# Patient Record
Sex: FEMALE | Race: BLACK OR AFRICAN AMERICAN | NOT HISPANIC OR LATINO | Employment: FULL TIME | URBAN - METROPOLITAN AREA
[De-identification: names, ages, dates, MRNs, and addresses within clinical notes are randomized per-mention and may not be internally consistent; named-entity substitution may affect disease eponyms.]

---

## 2023-11-23 ENCOUNTER — HOSPITAL ENCOUNTER (EMERGENCY)
Facility: HOSPITAL | Age: 23
Discharge: LEFT BEFORE DISCHARGE | End: 2023-11-24
Payer: COMMERCIAL

## 2023-11-23 ENCOUNTER — APPOINTMENT (OUTPATIENT)
Dept: RADIOLOGY | Facility: HOSPITAL | Age: 23
End: 2023-11-23
Payer: COMMERCIAL

## 2023-11-23 VITALS
TEMPERATURE: 97.3 F | SYSTOLIC BLOOD PRESSURE: 136 MMHG | WEIGHT: 153 LBS | BODY MASS INDEX: 27.11 KG/M2 | OXYGEN SATURATION: 98 % | RESPIRATION RATE: 18 BRPM | HEART RATE: 82 BPM | DIASTOLIC BLOOD PRESSURE: 110 MMHG | HEIGHT: 63 IN

## 2023-11-23 PROCEDURE — 71046 X-RAY EXAM CHEST 2 VIEWS: CPT | Performed by: RADIOLOGY

## 2023-11-23 PROCEDURE — 71046 X-RAY EXAM CHEST 2 VIEWS: CPT | Mod: FY

## 2023-11-23 PROCEDURE — 99284 EMERGENCY DEPT VISIT MOD MDM: CPT | Mod: 25

## 2023-11-23 PROCEDURE — 99281 EMR DPT VST MAYX REQ PHY/QHP: CPT

## 2023-11-23 ASSESSMENT — PAIN - FUNCTIONAL ASSESSMENT: PAIN_FUNCTIONAL_ASSESSMENT: 0-10

## 2023-11-23 ASSESSMENT — COLUMBIA-SUICIDE SEVERITY RATING SCALE - C-SSRS
6. HAVE YOU EVER DONE ANYTHING, STARTED TO DO ANYTHING, OR PREPARED TO DO ANYTHING TO END YOUR LIFE?: NO
2. HAVE YOU ACTUALLY HAD ANY THOUGHTS OF KILLING YOURSELF?: NO
1. IN THE PAST MONTH, HAVE YOU WISHED YOU WERE DEAD OR WISHED YOU COULD GO TO SLEEP AND NOT WAKE UP?: NO

## 2023-11-23 ASSESSMENT — PAIN DESCRIPTION - PAIN TYPE: TYPE: ACUTE PAIN

## 2023-11-23 ASSESSMENT — PAIN DESCRIPTION - FREQUENCY: FREQUENCY: CONSTANT/CONTINUOUS

## 2023-11-23 ASSESSMENT — PAIN DESCRIPTION - LOCATION: LOCATION: CHEST

## 2023-11-23 ASSESSMENT — PAIN SCALES - GENERAL: PAINLEVEL_OUTOF10: 10 - WORST POSSIBLE PAIN

## 2023-11-23 ASSESSMENT — PAIN DESCRIPTION - DESCRIPTORS: DESCRIPTORS: SHARP

## 2023-11-24 LAB
ALBUMIN SERPL BCP-MCNC: 4.5 G/DL (ref 3.4–5)
ALP SERPL-CCNC: 64 U/L (ref 33–110)
ALT SERPL W P-5'-P-CCNC: 9 U/L (ref 7–45)
ANION GAP SERPL CALC-SCNC: 12 MMOL/L (ref 10–20)
AST SERPL W P-5'-P-CCNC: 13 U/L (ref 9–39)
B-HCG SERPL-ACNC: <2 MIU/ML
BASOPHILS # BLD AUTO: 0.03 X10*3/UL (ref 0–0.1)
BASOPHILS NFR BLD AUTO: 0.3 %
BILIRUB SERPL-MCNC: 0.4 MG/DL (ref 0–1.2)
BUN SERPL-MCNC: 17 MG/DL (ref 6–23)
CALCIUM SERPL-MCNC: 9.6 MG/DL (ref 8.6–10.3)
CARDIAC TROPONIN I PNL SERPL HS: <3 NG/L (ref 0–13)
CHLORIDE SERPL-SCNC: 104 MMOL/L (ref 98–107)
CO2 SERPL-SCNC: 27 MMOL/L (ref 21–32)
CREAT SERPL-MCNC: 0.73 MG/DL (ref 0.5–1.05)
EOSINOPHIL # BLD AUTO: 0.01 X10*3/UL (ref 0–0.7)
EOSINOPHIL NFR BLD AUTO: 0.1 %
ERYTHROCYTE [DISTWIDTH] IN BLOOD BY AUTOMATED COUNT: 11.2 % (ref 11.5–14.5)
GFR SERPL CREATININE-BSD FRML MDRD: >90 ML/MIN/1.73M*2
GLUCOSE SERPL-MCNC: 109 MG/DL (ref 74–99)
HCT VFR BLD AUTO: 39.5 % (ref 36–46)
HGB BLD-MCNC: 13.2 G/DL (ref 12–16)
IMM GRANULOCYTES # BLD AUTO: 0.03 X10*3/UL (ref 0–0.7)
IMM GRANULOCYTES NFR BLD AUTO: 0.3 % (ref 0–0.9)
LYMPHOCYTES # BLD AUTO: 2.14 X10*3/UL (ref 1.2–4.8)
LYMPHOCYTES NFR BLD AUTO: 18.3 %
MAGNESIUM SERPL-MCNC: 1.9 MG/DL (ref 1.6–2.4)
MCH RBC QN AUTO: 29.2 PG (ref 26–34)
MCHC RBC AUTO-ENTMCNC: 33.4 G/DL (ref 32–36)
MCV RBC AUTO: 87 FL (ref 80–100)
MONOCYTES # BLD AUTO: 0.41 X10*3/UL (ref 0.1–1)
MONOCYTES NFR BLD AUTO: 3.5 %
NEUTROPHILS # BLD AUTO: 9.09 X10*3/UL (ref 1.2–7.7)
NEUTROPHILS NFR BLD AUTO: 77.5 %
NRBC BLD-RTO: 0 /100 WBCS (ref 0–0)
PLATELET # BLD AUTO: 308 X10*3/UL (ref 150–450)
POTASSIUM SERPL-SCNC: 4.1 MMOL/L (ref 3.5–5.3)
PROT SERPL-MCNC: 7.5 G/DL (ref 6.4–8.2)
RBC # BLD AUTO: 4.52 X10*6/UL (ref 4–5.2)
SODIUM SERPL-SCNC: 139 MMOL/L (ref 136–145)
WBC # BLD AUTO: 11.7 X10*3/UL (ref 4.4–11.3)

## 2023-11-24 PROCEDURE — 80053 COMPREHEN METABOLIC PANEL: CPT

## 2023-11-24 PROCEDURE — 85025 COMPLETE CBC W/AUTO DIFF WBC: CPT

## 2023-11-24 PROCEDURE — 84702 CHORIONIC GONADOTROPIN TEST: CPT

## 2023-11-24 PROCEDURE — 84484 ASSAY OF TROPONIN QUANT: CPT

## 2023-11-24 PROCEDURE — 83735 ASSAY OF MAGNESIUM: CPT

## 2023-11-24 PROCEDURE — 36415 COLL VENOUS BLD VENIPUNCTURE: CPT

## 2024-08-16 ENCOUNTER — HOSPITAL ENCOUNTER (EMERGENCY)
Facility: HOSPITAL | Age: 24
Discharge: HOME | End: 2024-08-16
Attending: EMERGENCY MEDICINE
Payer: COMMERCIAL

## 2024-08-16 ENCOUNTER — APPOINTMENT (OUTPATIENT)
Dept: RADIOLOGY | Facility: HOSPITAL | Age: 24
End: 2024-08-16
Payer: COMMERCIAL

## 2024-08-16 ENCOUNTER — APPOINTMENT (OUTPATIENT)
Dept: CARDIOLOGY | Facility: HOSPITAL | Age: 24
End: 2024-08-16
Payer: COMMERCIAL

## 2024-08-16 VITALS
HEIGHT: 63 IN | HEART RATE: 73 BPM | WEIGHT: 158 LBS | BODY MASS INDEX: 28 KG/M2 | TEMPERATURE: 98.3 F | SYSTOLIC BLOOD PRESSURE: 107 MMHG | OXYGEN SATURATION: 100 % | DIASTOLIC BLOOD PRESSURE: 76 MMHG | RESPIRATION RATE: 16 BRPM

## 2024-08-16 DIAGNOSIS — D69.6 THROMBOCYTOPENIA (CMS-HCC): ICD-10-CM

## 2024-08-16 DIAGNOSIS — R11.2 NAUSEA AND VOMITING, UNSPECIFIED VOMITING TYPE: Primary | ICD-10-CM

## 2024-08-16 LAB
ALBUMIN SERPL BCP-MCNC: 4.8 G/DL (ref 3.4–5)
ALP SERPL-CCNC: 73 U/L (ref 33–110)
ALT SERPL W P-5'-P-CCNC: 18 U/L (ref 7–45)
ANION GAP SERPL CALC-SCNC: 11 MMOL/L (ref 10–20)
APPEARANCE UR: ABNORMAL
APTT PPP: 31 SECONDS (ref 27–38)
AST SERPL W P-5'-P-CCNC: 22 U/L (ref 9–39)
B-HCG SERPL-ACNC: <2 MIU/ML
BACTERIA #/AREA URNS AUTO: ABNORMAL /HPF
BASOPHILS # BLD AUTO: 0.04 X10*3/UL (ref 0–0.1)
BASOPHILS NFR BLD AUTO: 0.7 %
BILIRUB SERPL-MCNC: 0.4 MG/DL (ref 0–1.2)
BILIRUB UR STRIP.AUTO-MCNC: NEGATIVE MG/DL
BUN SERPL-MCNC: 11 MG/DL (ref 6–23)
CALCIUM SERPL-MCNC: 9.5 MG/DL (ref 8.6–10.3)
CARDIAC TROPONIN I PNL SERPL HS: <3 NG/L (ref 0–13)
CARDIAC TROPONIN I PNL SERPL HS: <3 NG/L (ref 0–13)
CHLORIDE SERPL-SCNC: 102 MMOL/L (ref 98–107)
CO2 SERPL-SCNC: 27 MMOL/L (ref 21–32)
COLOR UR: ABNORMAL
CREAT SERPL-MCNC: 0.72 MG/DL (ref 0.5–1.05)
EGFRCR SERPLBLD CKD-EPI 2021: >90 ML/MIN/1.73M*2
EOSINOPHIL # BLD AUTO: 0.12 X10*3/UL (ref 0–0.7)
EOSINOPHIL NFR BLD AUTO: 2 %
ERYTHROCYTE [DISTWIDTH] IN BLOOD BY AUTOMATED COUNT: 14.2 % (ref 11.5–14.5)
GLUCOSE SERPL-MCNC: 80 MG/DL (ref 74–99)
GLUCOSE UR STRIP.AUTO-MCNC: NORMAL MG/DL
HCT VFR BLD AUTO: 41 % (ref 36–46)
HGB BLD-MCNC: 13.2 G/DL (ref 12–16)
IMM GRANULOCYTES # BLD AUTO: 0.04 X10*3/UL (ref 0–0.7)
IMM GRANULOCYTES NFR BLD AUTO: 0.7 % (ref 0–0.9)
INR PPP: 1.1 (ref 0.9–1.1)
KETONES UR STRIP.AUTO-MCNC: NEGATIVE MG/DL
LEUKOCYTE ESTERASE UR QL STRIP.AUTO: ABNORMAL
LIPASE SERPL-CCNC: 74 U/L (ref 9–82)
LYMPHOCYTES # BLD AUTO: 2.18 X10*3/UL (ref 1.2–4.8)
LYMPHOCYTES NFR BLD AUTO: 36.1 %
MCH RBC QN AUTO: 27.8 PG (ref 26–34)
MCHC RBC AUTO-ENTMCNC: 32.2 G/DL (ref 32–36)
MCV RBC AUTO: 86 FL (ref 80–100)
MONOCYTES # BLD AUTO: 0.48 X10*3/UL (ref 0.1–1)
MONOCYTES NFR BLD AUTO: 7.9 %
MUCOUS THREADS #/AREA URNS AUTO: ABNORMAL /LPF
NEUTROPHILS # BLD AUTO: 3.18 X10*3/UL (ref 1.2–7.7)
NEUTROPHILS NFR BLD AUTO: 52.6 %
NITRITE UR QL STRIP.AUTO: NEGATIVE
NRBC BLD-RTO: 0 /100 WBCS (ref 0–0)
PH UR STRIP.AUTO: 8 [PH]
PLATELET # BLD AUTO: 71 X10*3/UL (ref 150–450)
POTASSIUM SERPL-SCNC: 4.2 MMOL/L (ref 3.5–5.3)
PROT SERPL-MCNC: 9.1 G/DL (ref 6.4–8.2)
PROT UR STRIP.AUTO-MCNC: ABNORMAL MG/DL
PROTHROMBIN TIME: 12.6 SECONDS (ref 9.8–12.8)
RBC # BLD AUTO: 4.75 X10*6/UL (ref 4–5.2)
RBC # UR STRIP.AUTO: NEGATIVE /UL
RBC #/AREA URNS AUTO: ABNORMAL /HPF
RBC MORPH BLD: NORMAL
SARS-COV-2 RNA RESP QL NAA+PROBE: NOT DETECTED
SODIUM SERPL-SCNC: 136 MMOL/L (ref 136–145)
SP GR UR STRIP.AUTO: 1.02
SQUAMOUS #/AREA URNS AUTO: ABNORMAL /HPF
UROBILINOGEN UR STRIP.AUTO-MCNC: NORMAL MG/DL
WBC # BLD AUTO: 6 X10*3/UL (ref 4.4–11.3)
WBC #/AREA URNS AUTO: ABNORMAL /HPF

## 2024-08-16 PROCEDURE — 70450 CT HEAD/BRAIN W/O DYE: CPT

## 2024-08-16 PROCEDURE — 71046 X-RAY EXAM CHEST 2 VIEWS: CPT

## 2024-08-16 PROCEDURE — 81001 URINALYSIS AUTO W/SCOPE: CPT | Performed by: EMERGENCY MEDICINE

## 2024-08-16 PROCEDURE — 83690 ASSAY OF LIPASE: CPT | Performed by: SURGERY

## 2024-08-16 PROCEDURE — 80053 COMPREHEN METABOLIC PANEL: CPT | Performed by: EMERGENCY MEDICINE

## 2024-08-16 PROCEDURE — 84702 CHORIONIC GONADOTROPIN TEST: CPT | Performed by: SURGERY

## 2024-08-16 PROCEDURE — 71046 X-RAY EXAM CHEST 2 VIEWS: CPT | Performed by: RADIOLOGY

## 2024-08-16 PROCEDURE — 99285 EMERGENCY DEPT VISIT HI MDM: CPT | Mod: 25

## 2024-08-16 PROCEDURE — 36415 COLL VENOUS BLD VENIPUNCTURE: CPT | Performed by: EMERGENCY MEDICINE

## 2024-08-16 PROCEDURE — 84484 ASSAY OF TROPONIN QUANT: CPT | Performed by: EMERGENCY MEDICINE

## 2024-08-16 PROCEDURE — 85610 PROTHROMBIN TIME: CPT | Performed by: EMERGENCY MEDICINE

## 2024-08-16 PROCEDURE — 93005 ELECTROCARDIOGRAM TRACING: CPT | Mod: 59

## 2024-08-16 PROCEDURE — 70450 CT HEAD/BRAIN W/O DYE: CPT | Performed by: RADIOLOGY

## 2024-08-16 PROCEDURE — 87635 SARS-COV-2 COVID-19 AMP PRB: CPT | Performed by: EMERGENCY MEDICINE

## 2024-08-16 PROCEDURE — 2500000004 HC RX 250 GENERAL PHARMACY W/ HCPCS (ALT 636 FOR OP/ED): Performed by: EMERGENCY MEDICINE

## 2024-08-16 PROCEDURE — 93005 ELECTROCARDIOGRAM TRACING: CPT

## 2024-08-16 PROCEDURE — 85025 COMPLETE CBC W/AUTO DIFF WBC: CPT | Performed by: EMERGENCY MEDICINE

## 2024-08-16 PROCEDURE — 96374 THER/PROPH/DIAG INJ IV PUSH: CPT

## 2024-08-16 RX ORDER — ONDANSETRON HYDROCHLORIDE 2 MG/ML
4 INJECTION, SOLUTION INTRAVENOUS ONCE
Status: COMPLETED | OUTPATIENT
Start: 2024-08-16 | End: 2024-08-16

## 2024-08-16 RX ORDER — ACETAMINOPHEN 325 MG/1
975 TABLET ORAL ONCE
Status: COMPLETED | OUTPATIENT
Start: 2024-08-16 | End: 2024-08-16

## 2024-08-16 RX ORDER — ONDANSETRON 4 MG/1
4 TABLET, FILM COATED ORAL EVERY 6 HOURS
Qty: 8 TABLET | Refills: 0 | Status: SHIPPED | OUTPATIENT
Start: 2024-08-16 | End: 2024-08-18

## 2024-08-16 ASSESSMENT — PAIN - FUNCTIONAL ASSESSMENT
PAIN_FUNCTIONAL_ASSESSMENT: 0-10
PAIN_FUNCTIONAL_ASSESSMENT: 0-10

## 2024-08-16 ASSESSMENT — PAIN SCALES - GENERAL
PAINLEVEL_OUTOF10: 7
PAINLEVEL_OUTOF10: 6

## 2024-08-16 ASSESSMENT — PAIN DESCRIPTION - LOCATION: LOCATION: HEAD

## 2024-08-16 ASSESSMENT — PAIN DESCRIPTION - DESCRIPTORS: DESCRIPTORS: SHARP

## 2024-08-16 NOTE — DISCHARGE INSTRUCTIONS
Please make sure to schedule an appointment with hematology and to establish care with primary care physician.  Return immediately if concerning symptoms, as discussed.

## 2024-08-16 NOTE — PROGRESS NOTES
Pharmacy Medication History Review    Erika Guardado is a 24 y.o. female admitted for No Principal Problem: There is no principal problem currently on the Problem List. Please update the Problem List and refresh.. Pharmacy reviewed the patient's [id number] medications and allergies for accuracy.    The list below reflectives the updated PTA list. Please review each medication in order reconciliation for additional clarification and justification.  Prior to Admission medications    Not on File        The list below reflectives the updated allergy list. Please review each documented allergy for additional clarification and justification.  Allergies  Reviewed by Keegan Tran CPhT on 8/16/2024        Severity Reactions Comments    Amoxicillin Medium Rash, Unknown rash   rash rash            Below are additional concerns with the patient's PTA list.  The following updates were made to the Prior to Admission medication list:     Source of Information:     Medications ADDED:   N/a  Medications CHANGED:  N/a  Medications REMOVED:   N/a  Medications NOT TAKING:   N/a    Allergy reviewed : Yes    Comments: spoke to patient      Keegan Tran CPhT

## 2024-08-16 NOTE — ED TRIAGE NOTES
Pt presents with the c/o vomiting after eating [employer] this morning. Pt states that she started experiencing chest pain after having a near syncopal episode about an hour ago. Pt currently rates her pain 7/10 and describes as sharp.

## 2024-08-16 NOTE — ED PROVIDER NOTES
HPI   Chief Complaint   Patient presents with    Chest Pain       HPI  Patient is a 24-year-old female with a past medical history significant for blood transfusion after delivery in 2021, cholecystectomy in December 2023 during which time she received Lovenox for DVT prophylaxis who presents emergency room with a chief complaint of chest pain, nausea, vomiting.  Patient states that she has been experiencing some bruising since Tuesday as far she is aware.  She states that even with minimal trauma she has developed bruises.  She was otherwise well until this morning and she ate hash brown from [employer] and started having vomiting.  She states that she vomited repeatedly but had a normal bowel movement that was nonbloody nonmelanotic.  No blood was noted in the emesis.  She developed a severe headache during this time as well which was just prior to arrival here to the emergency room.  At that time she felt like she could not stand up but also did not take anything for headache.  While sitting here she feels like her symptoms have improved.  During this time she also felt weak and shaky without overt signs of seizure.  Of note, patient was told in the past that she has thrombocytopenia since December but has no primary care physician so she has yet to follow-up.  She is not currently on any medication.    PMHx: As above  PSHx: Cholecystectomy, tonsillectomy  FamilyHx: Denies  SocialHx: Denies  Allergies: Amoxicillin  Medications: See Medication Reconciliation     ROS  As above otherwise denies      Physical Exam    GENERAL: Awake and Alert, No Acute Distress  HEENT: AT/NC, PERRL, EOMI, Normal Oropharynx, No Signs of Dehydration  NECK: Normal Inspection, No JVD  CARDIOVASCULAR: RRR, No M/R/G  RESPIRATORY: CTA Bilaterally, No Wheezes, Rales or Rhonchi, Chest Wall Non-tender  ABDOMEN: Soft, non-tender abdomen, Normal Bowel Sounds, No Distention  BACK: No CVA Tenderness  SKIN: Normal Color, Warm, Dry, No Rashes    EXTREMITIES: Bruising bilateral upper extremity Non-Tender, Full ROM, No Pedal Edema  NEURO: A&O x 3, Normal Motor and Sensation, Normal Mood and Affect    Nursing Assessment and Vitals Reviewed    EKG showed a normal sinus rhythm at 83 bpm.  There are T wave inversions in V3.  No ischemic ST changes.  No axis deviation.    Medical Decision  Patient is a 24-year-old female with a past medical history significant for blood transfusion after delivery in 2021, cholecystectomy in December 2023 during which time she received Lovenox for DVT prophylaxis who presents emergency room with a chief complaint of chest pain, nausea, vomiting.  Patient states that she has been experiencing some bruising since Tuesday as far she is aware.  She states that even with minimal trauma she has developed bruises.  She was otherwise well until this morning and she ate hash brown from [employer] and started having vomiting.  She states that she vomited repeatedly but had a normal bowel movement that was nonbloody nonmelanotic.  No blood was noted in the emesis.  She developed a severe headache during this time as well which was just prior to arrival here to the emergency room.  At that time she felt like she could not stand up but also did not take anything for headache.  While sitting here she feels like her symptoms have improved.  During this time she also felt weak and shaky without overt signs of seizure.  Of note, patient was told in the past that she has thrombocytopenia since December but has no primary care physician so she has yet to follow-up.  She is not currently on any medication.    I evaluated patient in T1 owing to large patient volumes.  At that time patient has some preliminary blood work that showed thrombocytopenia.  Our most recent lab work for patient in 2023 showed platelets of 308.  Patient does state that she has been told in the past that she has thrombocytopenia which she has yet to follow-up.  I will add CT  scan of the head as she developed a significant headache during this episode a couple of hours ago.  She has no meningismus and is otherwise neurologically intact.  Low suspicion for acute or cranial pathology, however, in the setting of thrombocytopenia and significant retching this morning we'll evaluate further.  In the meantime patient is ordered Zofran for nausea and Tylenol for pain and will be moved to a bed in the back.    Workup for patient also included troponins that were negative x 2.  No leukocytosis or anemia is appreciated and chest x-ray showed no acute pathology.    CT head Did not reveal any acute intracranial pathology.  Valuation patient is very well-appearing and in no acute distress and was able to tolerate p.o. without difficulty.  We had an extensive discussion regarding the importance of scheduling a follow-up appointment with a primary care physician to establish care as well as hematology.  She is given a prescription for Zofran in case she has any further episodes.  She is thoroughly educated on signs and symptoms that should prompt immediate return to the emergency room.              Patient History   Past Medical History:   Diagnosis Date    Personal history of other diseases of the respiratory system     History of asthma    Personal history of other diseases of the respiratory system     History of chronic pharyngitis    Personal history of other diseases of the respiratory system     History of sinusitis    Personal history of other diseases of the respiratory system 10/18/2016    History of acute pharyngitis    Snoring     Snoring     Past Surgical History:   Procedure Laterality Date    OTHER SURGICAL HISTORY  09/15/2015    Prior Surgical Procedure Not Done    TONSILLECTOMY  03/10/2016    Tonsillectomy With Adenoidectomy     No family history on file.  Social History     Tobacco Use    Smoking status: Never    Smokeless tobacco: Never   Substance Use Topics    Alcohol use: Not on  file    Drug use: Not on file       Physical Exam   ED Triage Vitals   Temperature Heart Rate Respirations BP   08/16/24 1104 08/16/24 1104 08/16/24 1104 08/16/24 1104   36.2 °C (97.2 °F) 80 18 110/85      Pulse Ox Temp Source Heart Rate Source Patient Position   08/16/24 1104 08/16/24 1328 08/16/24 1328 --   99 % Oral Monitor       BP Location FiO2 (%)     08/16/24 1328 --     Left arm        Physical Exam      ED Course & MDM   Diagnoses as of 08/16/24 1706   Nausea and vomiting, unspecified vomiting type   Thrombocytopenia (CMS-HCC)                 No data recorded     [place] Coma Scale Score: 15 (08/16/24 1105 : Leonard Decker RN)                           Medical Decision Making      Procedure  Procedures     Katharine Francois MD  08/16/24 1706

## 2024-08-20 ENCOUNTER — OFFICE VISIT (OUTPATIENT)
Dept: PRIMARY CARE | Facility: CLINIC | Age: 24
End: 2024-08-20
Payer: COMMERCIAL

## 2024-08-20 VITALS
HEART RATE: 73 BPM | HEIGHT: 63 IN | OXYGEN SATURATION: 99 % | BODY MASS INDEX: 26.58 KG/M2 | SYSTOLIC BLOOD PRESSURE: 96 MMHG | RESPIRATION RATE: 12 BRPM | WEIGHT: 150 LBS | DIASTOLIC BLOOD PRESSURE: 68 MMHG

## 2024-08-20 DIAGNOSIS — D69.6 THROMBOCYTOPENIA (CMS-HCC): ICD-10-CM

## 2024-08-20 PROBLEM — S92.919A CLOSED FRACTURE OF PHALANX OF TOE: Status: ACTIVE | Noted: 2024-08-20

## 2024-08-20 PROBLEM — E55.9 VITAMIN D DEFICIENCY: Status: ACTIVE | Noted: 2018-01-08

## 2024-08-20 PROBLEM — G56.00 CARPAL TUNNEL SYNDROME: Status: ACTIVE | Noted: 2024-08-20

## 2024-08-20 PROBLEM — K21.9 LPRD (LARYNGOPHARYNGEAL REFLUX DISEASE): Status: ACTIVE | Noted: 2024-08-20

## 2024-08-20 PROBLEM — S69.91XA INJURY OF FINGER OF RIGHT HAND: Status: ACTIVE | Noted: 2017-08-31

## 2024-08-20 PROBLEM — L70.9 ACNE: Status: ACTIVE | Noted: 2024-08-20

## 2024-08-20 PROBLEM — M25.561 RIGHT KNEE PAIN: Status: ACTIVE | Noted: 2024-08-20

## 2024-08-20 PROBLEM — J45.909 ASTHMA (HHS-HCC): Status: ACTIVE | Noted: 2018-09-21

## 2024-08-20 PROBLEM — R06.83 SNORING: Status: ACTIVE | Noted: 2024-08-20

## 2024-08-20 PROBLEM — M76.51 PATELLAR TENDINITIS OF RIGHT KNEE: Status: ACTIVE | Noted: 2024-08-20

## 2024-08-20 LAB
ATRIAL RATE: 83 BPM
P AXIS: 66 DEGREES
P OFFSET: 199 MS
P ONSET: 143 MS
PR INTERVAL: 164 MS
Q ONSET: 225 MS
QRS COUNT: 13 BEATS
QRS DURATION: 68 MS
QT INTERVAL: 366 MS
QTC CALCULATION(BAZETT): 430 MS
QTC FREDERICIA: 408 MS
R AXIS: 41 DEGREES
T AXIS: 46 DEGREES
T OFFSET: 408 MS
VENTRICULAR RATE: 83 BPM

## 2024-08-20 PROCEDURE — 99204 OFFICE O/P NEW MOD 45 MIN: CPT | Performed by: INTERNAL MEDICINE

## 2024-08-20 PROCEDURE — 1036F TOBACCO NON-USER: CPT | Performed by: INTERNAL MEDICINE

## 2024-08-20 PROCEDURE — 3008F BODY MASS INDEX DOCD: CPT | Performed by: INTERNAL MEDICINE

## 2024-08-20 RX ORDER — ACETAMINOPHEN 500 MG
1000 TABLET ORAL EVERY 6 HOURS PRN
COMMUNITY

## 2024-08-20 NOTE — PROGRESS NOTES
"Subjective   Chief complaint: Erika Guardado is a 24 y.o. female who presents for Follow-up ( patient being seen for emergency room follow-up for passing out at work.).    HPI:  Patient is here as a new patient as well as following ED visit for near syncope. She states she felt light headed, weak and dizzy with a headache which prompted her to go to the ED on 8/16/24. There, they found that she had thrombocytopenia, did a head CT, which was negative and sent her home. She is feeling better now. She states that when she had her daughter in 2022 and when she had her gallbladder removed in December of 2023, they told her she had thrombocytopenia, but was never referred or treated. She denies drug or tobacco use, drinks alcohol socially. She does endorse easy bruising that she has noticed recently. She does not take any medications, or has any other chronic medical conditions.         Objective   BP 96/68   Pulse 73   Resp 12   Ht 1.6 m (5' 3\")   Wt 68 kg (150 lb)   SpO2 99%   BMI 26.57 kg/m²   Physical Exam  HENT:      Head: Normocephalic.      Nose: Nose normal.      Mouth/Throat:      Mouth: Mucous membranes are moist.      Pharynx: Oropharynx is clear.   Eyes:      Pupils: Pupils are equal, round, and reactive to light.   Cardiovascular:      Rate and Rhythm: Normal rate and regular rhythm.      Pulses: Normal pulses.      Heart sounds: Normal heart sounds.   Pulmonary:      Effort: Pulmonary effort is normal.      Breath sounds: Normal breath sounds.   Abdominal:      General: Bowel sounds are normal.   Musculoskeletal:         General: Normal range of motion.      Cervical back: Normal range of motion.   Skin:     General: Skin is warm and dry.      Capillary Refill: Capillary refill takes less than 2 seconds.   Neurological:      Mental Status: She is alert and oriented to person, place, and time.         I have reviewed and reconciled the medication list with the patient today.   Current Outpatient " Medications:     acetaminophen (Tylenol) 500 mg tablet, Take 2 tablets (1,000 mg) by mouth every 6 hours if needed., Disp: , Rfl:      Imaging:  ECG 12 lead    Result Date: 8/20/2024  Normal sinus rhythm Normal ECG When compared with ECG of 23-NOV-2023 22:49, Previous ECG has undetermined rhythm, needs review See ED provider note for full interpretation and clinical correlation Confirmed by Nancy Grady ([id number]) on 8/20/2024 11:34:26 AM    CT head wo IV contrast    Result Date: 8/16/2024  Interpreted By:  Simon Benavides, STUDY: CT HEAD WO IV CONTRAST; ;  8/16/2024 3:31 pm   INDICATION: Signs/Symptoms:HA low platelets.   COMPARISON: 03/07/2023   ACCESSION NUMBER(S): [id number]   ORDERING CLINICIAN: RODOLFO BARRERA   TECHNIQUE: Serial axial CT images obtained of the head. Images reformatted in the coronal and sagittal projection   All CT examinations are performed with 1 or more of the following dose reduction techniques: Automated exposure control, adjustment of mA and/or kv according to patient's size, or use of iterative reconstruction techniques.   FINDINGS: Ventricles, sulci, and cisterns are unremarkable. Gray-white differentiation maintained. No intra-axial or extra-axial blood or fluid collections are identified   Paranasal sinuses and mastoid air cells are unremarkable.               1. Normal CT head     MACRO: None   Signed by: Simon Benavides 8/16/2024 3:54 PM Dictation workstation:   [id number]    XR chest 2 views    Result Date: 8/16/2024  Interpreted By:  Hemanth Denis, STUDY: XR CHEST 2 VIEWS; 8/16/2024 11:32 am   INDICATION: Signs/Symptoms:cp. Chest pain. Vomiting after eating this morning.   COMPARISON: 11/23/2023   ACCESSION NUMBER(S): [id number]   ORDERING CLINICIAN: RAJESH JANSEN   TECHNIQUE: PA and lateral views of the chest were obtained.   FINDINGS: The cardiac silhouette is normal in appearance.   No infiltrates or effusions are identified.       No acute pathologic findings  are identified on this exam. There is no interval change when compared to the previous examination.   MACRO: none   Signed by: Hemanth Denis 8/16/2024 11:59 AM Dictation workstation:   [id number]       Labs reviewed:    Lab Results   Component Value Date    WBC 6.0 08/16/2024    HGB 13.2 08/16/2024    HCT 41.0 08/16/2024    PLT 71 (L) 08/16/2024    ALT 18 08/16/2024    AST 22 08/16/2024     08/16/2024    K 4.2 08/16/2024     08/16/2024    CREATININE 0.72 08/16/2024    BUN 11 08/16/2024    CO2 27 08/16/2024    INR 1.1 08/16/2024    HGBA1C 5.8 (H) 04/19/2023       Assessment/Plan   Problem List Items Addressed This Visit          Coag and Thromboembolic    Thrombocytopenia (CMS-HCC)     Last Blood work showed platelet level of 01156  Refer to hematology for workup of possible ITP            Continue current medications as listed  Follow up in 2 months for complete physical.

## 2024-09-05 PROBLEM — J30.9 ALLERGIC RHINITIS: Status: ACTIVE | Noted: 2024-09-05

## 2024-09-05 PROBLEM — O99.013 ANEMIA AFFECTING PREGNANCY IN THIRD TRIMESTER (HHS-HCC): Status: ACTIVE | Noted: 2021-09-24

## 2024-09-05 RX ORDER — IBUPROFEN 800 MG/1
800 TABLET ORAL 3 TIMES DAILY PRN
COMMUNITY

## 2024-10-15 ENCOUNTER — DOCUMENTATION (OUTPATIENT)
Dept: HEMATOLOGY/ONCOLOGY | Facility: HOSPITAL | Age: 24
End: 2024-10-15
Payer: COMMERCIAL

## 2024-10-15 NOTE — PROGRESS NOTES
Diagnosis thrombocytopenia. History includes-thrombocytopenia, anemia affecting pregnancy in third trimester, asthma, laryngopharyngeal reflux disease, Vitamin D deficiency, allergic rhinitis, DVT while pregnant.  Has had blood transfusions and iron infusions in the past while pregnant.  Last labs 8/16/24 RBC 4.75, HGB 13.2, Hematocrit 41.0, PLT 71  Patient  confirmed her appointment for 11/18/24 with ELLIOTT Chopra PA-C.

## 2024-10-17 ENCOUNTER — APPOINTMENT (OUTPATIENT)
Dept: PRIMARY CARE | Facility: CLINIC | Age: 24
End: 2024-10-17
Payer: COMMERCIAL

## 2024-10-21 ENCOUNTER — APPOINTMENT (OUTPATIENT)
Dept: PRIMARY CARE | Facility: CLINIC | Age: 24
End: 2024-10-21
Payer: COMMERCIAL

## 2024-11-01 ENCOUNTER — HOSPITAL ENCOUNTER (EMERGENCY)
Facility: HOSPITAL | Age: 24
Discharge: HOME | End: 2024-11-01
Payer: COMMERCIAL

## 2024-11-01 ENCOUNTER — APPOINTMENT (OUTPATIENT)
Dept: RADIOLOGY | Facility: HOSPITAL | Age: 24
End: 2024-11-01
Payer: COMMERCIAL

## 2024-11-01 VITALS
DIASTOLIC BLOOD PRESSURE: 82 MMHG | HEART RATE: 78 BPM | RESPIRATION RATE: 18 BRPM | TEMPERATURE: 97.7 F | OXYGEN SATURATION: 98 % | SYSTOLIC BLOOD PRESSURE: 125 MMHG

## 2024-11-01 DIAGNOSIS — S63.613A SPRAIN OF LEFT MIDDLE FINGER, UNSPECIFIED SITE OF DIGIT, INITIAL ENCOUNTER: Primary | ICD-10-CM

## 2024-11-01 PROCEDURE — 73140 X-RAY EXAM OF FINGER(S): CPT | Mod: LT

## 2024-11-01 PROCEDURE — 73140 X-RAY EXAM OF FINGER(S): CPT | Mod: LEFT SIDE | Performed by: RADIOLOGY

## 2024-11-01 PROCEDURE — 99283 EMERGENCY DEPT VISIT LOW MDM: CPT

## 2024-11-01 ASSESSMENT — COLUMBIA-SUICIDE SEVERITY RATING SCALE - C-SSRS
1. IN THE PAST MONTH, HAVE YOU WISHED YOU WERE DEAD OR WISHED YOU COULD GO TO SLEEP AND NOT WAKE UP?: NO
2. HAVE YOU ACTUALLY HAD ANY THOUGHTS OF KILLING YOURSELF?: NO
6. HAVE YOU EVER DONE ANYTHING, STARTED TO DO ANYTHING, OR PREPARED TO DO ANYTHING TO END YOUR LIFE?: NO

## 2024-11-01 ASSESSMENT — PAIN DESCRIPTION - LOCATION: LOCATION: FINGER (COMMENT WHICH ONE)

## 2024-11-01 ASSESSMENT — PAIN SCALES - GENERAL: PAINLEVEL_OUTOF10: 5 - MODERATE PAIN

## 2024-11-01 ASSESSMENT — PAIN - FUNCTIONAL ASSESSMENT: PAIN_FUNCTIONAL_ASSESSMENT: 0-10

## 2024-11-18 ENCOUNTER — LAB (OUTPATIENT)
Dept: LAB | Facility: LAB | Age: 24
End: 2024-11-18
Payer: COMMERCIAL

## 2024-11-18 ENCOUNTER — OFFICE VISIT (OUTPATIENT)
Dept: HEMATOLOGY/ONCOLOGY | Facility: CLINIC | Age: 24
End: 2024-11-18
Payer: COMMERCIAL

## 2024-11-18 VITALS
HEART RATE: 83 BPM | TEMPERATURE: 98.1 F | BODY MASS INDEX: 26.39 KG/M2 | OXYGEN SATURATION: 98 % | DIASTOLIC BLOOD PRESSURE: 77 MMHG | RESPIRATION RATE: 18 BRPM | WEIGHT: 149 LBS | SYSTOLIC BLOOD PRESSURE: 108 MMHG

## 2024-11-18 DIAGNOSIS — D69.6 THROMBOCYTOPENIA (CMS-HCC): ICD-10-CM

## 2024-11-18 LAB
ALBUMIN SERPL BCP-MCNC: 5.1 G/DL (ref 3.4–5)
ALP SERPL-CCNC: 74 U/L (ref 33–110)
ALT SERPL W P-5'-P-CCNC: 21 U/L (ref 7–45)
ANION GAP SERPL CALC-SCNC: 14 MMOL/L (ref 10–20)
APTT PPP: 26 SECONDS (ref 27–38)
AST SERPL W P-5'-P-CCNC: 15 U/L (ref 9–39)
B2 GLYCOPROT1 IGA SER-ACNC: <0.6 U/ML
B2 GLYCOPROT1 IGG SER-ACNC: <1.4 U/ML
B2 GLYCOPROT1 IGM SER-ACNC: <0.2 U/ML
BASOPHILS # BLD AUTO: 0.03 X10*3/UL (ref 0–0.1)
BASOPHILS NFR BLD AUTO: 0.6 %
BILIRUB SERPL-MCNC: 0.7 MG/DL (ref 0–1.2)
BUN SERPL-MCNC: 11 MG/DL (ref 6–23)
CALCIUM SERPL-MCNC: 10 MG/DL (ref 8.6–10.6)
CARDIOLIPIN IGA SERPL-ACNC: <0.5 APL U/ML
CARDIOLIPIN IGG SER IA-ACNC: <1.6 GPL U/ML
CARDIOLIPIN IGM SER IA-ACNC: <0.2 MPL U/ML
CHLORIDE SERPL-SCNC: 103 MMOL/L (ref 98–107)
CO2 SERPL-SCNC: 25 MMOL/L (ref 21–32)
CREAT SERPL-MCNC: 0.81 MG/DL (ref 0.5–1.05)
CRP SERPL-MCNC: 0.26 MG/DL
EBV EA IGG SER QL: NEGATIVE
EBV NA AB SER QL: POSITIVE
EBV VCA IGG SER IA-ACNC: POSITIVE
EBV VCA IGM SER IA-ACNC: NEGATIVE
EGFRCR SERPLBLD CKD-EPI 2021: >90 ML/MIN/1.73M*2
EOSINOPHIL # BLD AUTO: 0.16 X10*3/UL (ref 0–0.7)
EOSINOPHIL NFR BLD AUTO: 3.3 %
ERYTHROCYTE [DISTWIDTH] IN BLOOD BY AUTOMATED COUNT: 13.9 % (ref 11.5–14.5)
ERYTHROCYTE [SEDIMENTATION RATE] IN BLOOD BY WESTERGREN METHOD: 39 MM/H (ref 0–20)
FERRITIN SERPL-MCNC: 66 NG/ML (ref 8–150)
FIBRINOGEN PPP-MCNC: 318 MG/DL (ref 200–400)
FOLATE SERPL-MCNC: 12.6 NG/ML
GLUCOSE SERPL-MCNC: 89 MG/DL (ref 74–99)
HAPTOGLOB SERPL NEPH-MCNC: 191 MG/DL (ref 30–200)
HBV CORE IGM SER QL: NONREACTIVE
HBV SURFACE AG SERPL QL IA: NONREACTIVE
HCT VFR BLD AUTO: 45.8 % (ref 36–46)
HCV AB SER QL: NONREACTIVE
HGB BLD-MCNC: 14.7 G/DL (ref 12–16)
HGB RETIC QN: 32 PG (ref 28–38)
HIV 1+2 AB+HIV1 P24 AG SERPL QL IA: NONREACTIVE
HOLD SPECIMEN: NORMAL
IMM GRANULOCYTES # BLD AUTO: 0.02 X10*3/UL (ref 0–0.7)
IMM GRANULOCYTES NFR BLD AUTO: 0.4 % (ref 0–0.9)
IMMATURE RETIC FRACTION: 5.7 %
INR PPP: 1 (ref 0.9–1.1)
IRON SATN MFR SERPL: 24 % (ref 25–45)
IRON SERPL-MCNC: 106 UG/DL (ref 35–150)
LDH SERPL L TO P-CCNC: 125 U/L (ref 84–246)
LYMPHOCYTES # BLD AUTO: 1.97 X10*3/UL (ref 1.2–4.8)
LYMPHOCYTES NFR BLD AUTO: 40.4 %
MCH RBC QN AUTO: 28.2 PG (ref 26–34)
MCHC RBC AUTO-ENTMCNC: 32.1 G/DL (ref 32–36)
MCV RBC AUTO: 88 FL (ref 80–100)
MONOCYTES # BLD AUTO: 0.23 X10*3/UL (ref 0.1–1)
MONOCYTES NFR BLD AUTO: 4.7 %
NEUTROPHILS # BLD AUTO: 2.47 X10*3/UL (ref 1.2–7.7)
NEUTROPHILS NFR BLD AUTO: 50.6 %
NRBC BLD-RTO: 0 /100 WBCS (ref 0–0)
PLATELET # BLD AUTO: 72 X10*3/UL (ref 150–450)
POTASSIUM SERPL-SCNC: 4.1 MMOL/L (ref 3.5–5.3)
PROT SERPL-MCNC: 8.9 G/DL (ref 6.4–8.2)
PROTHROMBIN TIME: 11 SECONDS (ref 9.8–12.8)
RBC # BLD AUTO: 5.22 X10*6/UL (ref 4–5.2)
RETICS #: 0.09 X10*6/UL (ref 0.02–0.08)
RETICS/RBC NFR AUTO: 1.6 % (ref 0.5–2)
RHEUMATOID FACT SER NEPH-ACNC: 47 IU/ML (ref 0–15)
SODIUM SERPL-SCNC: 138 MMOL/L (ref 136–145)
TIBC SERPL-MCNC: 442 UG/DL (ref 240–445)
TSH SERPL-ACNC: 0.61 MIU/L (ref 0.44–3.98)
UIBC SERPL-MCNC: 336 UG/DL (ref 110–370)
VIT B12 SERPL-MCNC: 682 PG/ML (ref 211–911)
WBC # BLD AUTO: 4.9 X10*3/UL (ref 4.4–11.3)

## 2024-11-18 PROCEDURE — 85613 RUSSELL VIPER VENOM DILUTED: CPT

## 2024-11-18 PROCEDURE — 82746 ASSAY OF FOLIC ACID SERUM: CPT

## 2024-11-18 PROCEDURE — 86140 C-REACTIVE PROTEIN: CPT

## 2024-11-18 PROCEDURE — 86705 HEP B CORE ANTIBODY IGM: CPT

## 2024-11-18 PROCEDURE — 85045 AUTOMATED RETICULOCYTE COUNT: CPT

## 2024-11-18 PROCEDURE — 82607 VITAMIN B-12: CPT

## 2024-11-18 PROCEDURE — 85730 THROMBOPLASTIN TIME PARTIAL: CPT

## 2024-11-18 PROCEDURE — 85384 FIBRINOGEN ACTIVITY: CPT

## 2024-11-18 PROCEDURE — 36415 COLL VENOUS BLD VENIPUNCTURE: CPT

## 2024-11-18 PROCEDURE — 83540 ASSAY OF IRON: CPT

## 2024-11-18 PROCEDURE — 87389 HIV-1 AG W/HIV-1&-2 AB AG IA: CPT

## 2024-11-18 PROCEDURE — 83010 ASSAY OF HAPTOGLOBIN QUANT: CPT

## 2024-11-18 PROCEDURE — 83550 IRON BINDING TEST: CPT

## 2024-11-18 PROCEDURE — 86146 BETA-2 GLYCOPROTEIN ANTIBODY: CPT

## 2024-11-18 PROCEDURE — 85025 COMPLETE CBC W/AUTO DIFF WBC: CPT

## 2024-11-18 PROCEDURE — 86431 RHEUMATOID FACTOR QUANT: CPT

## 2024-11-18 PROCEDURE — 99204 OFFICE O/P NEW MOD 45 MIN: CPT | Performed by: PHYSICIAN ASSISTANT

## 2024-11-18 PROCEDURE — 86664 EPSTEIN-BARR NUCLEAR ANTIGEN: CPT

## 2024-11-18 PROCEDURE — 86665 EPSTEIN-BARR CAPSID VCA: CPT

## 2024-11-18 PROCEDURE — 86803 HEPATITIS C AB TEST: CPT

## 2024-11-18 PROCEDURE — 1036F TOBACCO NON-USER: CPT | Performed by: PHYSICIAN ASSISTANT

## 2024-11-18 PROCEDURE — 86147 CARDIOLIPIN ANTIBODY EA IG: CPT

## 2024-11-18 PROCEDURE — 86038 ANTINUCLEAR ANTIBODIES: CPT

## 2024-11-18 PROCEDURE — 84443 ASSAY THYROID STIM HORMONE: CPT

## 2024-11-18 PROCEDURE — 87340 HEPATITIS B SURFACE AG IA: CPT

## 2024-11-18 PROCEDURE — 85610 PROTHROMBIN TIME: CPT

## 2024-11-18 PROCEDURE — 99214 OFFICE O/P EST MOD 30 MIN: CPT | Performed by: PHYSICIAN ASSISTANT

## 2024-11-18 PROCEDURE — 85652 RBC SED RATE AUTOMATED: CPT

## 2024-11-18 PROCEDURE — 83615 LACTATE (LD) (LDH) ENZYME: CPT

## 2024-11-18 PROCEDURE — 80053 COMPREHEN METABOLIC PANEL: CPT

## 2024-11-18 PROCEDURE — 86663 EPSTEIN-BARR ANTIBODY: CPT

## 2024-11-18 PROCEDURE — 82728 ASSAY OF FERRITIN: CPT

## 2024-11-18 ASSESSMENT — PAIN SCALES - GENERAL: PAINLEVEL_OUTOF10: 0-NO PAIN

## 2024-11-18 NOTE — PROGRESS NOTES
Patient ID: Erika Guardado is a 24 y.o. female.  Referring Physician: Juan Diego Wheeler MD   [address]  [facility], [facility] [address]  [place] [address]  Primary Care Provider: Juan Diego Wheeler MD  Visit Type: Initial Visit    Location: [facility]  Diagnosis/Reason: Thrombocytopenia    HPI:  Erika Guardado is a 24 y.o. female referred for consultation of thrombocytopenia    Per review of records:  Thrombocytopenia since at least 10/16//22 71K-308K since then    Menstrual Cycle History:  About how long do your periods last: Currently on Depo and reports not having periods    Previous Hematological Background:  Hx of hematological disorders: No - Patient denies prior hematologic history  Hx of blood transfusions: Yes - Denies adverse effect or reaction  Hx of iron supplementation: No - Denies any previous supplementation  Hx of B12 supplementation: No - Denies any prior supplementation  Hx of folate supplementation: No - Denies any prior supplementation    Today she c/o intermittent SOB, fatigue, intermittent dizziness/lightheadedness    Patient denies weight loss, abnormal bruising and bleeding, hematuria, blood in stool, dark/black stools, epistaxis, oral/gingival bleeding, lymphadenopathy, recurrent infections, recurrent fevers, night sweats, early satiety, abdominal pain, bone pain, chest pain, palpitations, PICA.    PMHx:  Active Ambulatory Problems     Diagnosis Date Noted    Asthma 09/21/2018    Closed fracture of phalanx of toe 08/20/2024    Injury of finger of right hand 08/31/2017    LPRD (laryngopharyngeal reflux disease) 08/20/2024    Myopia of both eyes 02/22/2016    Patellar tendinitis of right knee 08/20/2024    Acne 08/20/2024    Right knee pain 08/20/2024    Snoring 08/20/2024    Carpal tunnel syndrome 08/20/2024    Vitamin D deficiency 01/08/2018    Thrombocytopenia (CMS-HCC) 08/20/2024    Allergic rhinitis 09/05/2024    Anemia affecting pregnancy in third trimester  ([facility]) 09/24/2021     Resolved Ambulatory Problems     Diagnosis Date Noted    No Resolved Ambulatory Problems     Past Medical History:   Diagnosis Date    Personal history of other diseases of the respiratory system     Personal history of other diseases of the respiratory system     Personal history of other diseases of the respiratory system     Personal history of other diseases of the respiratory system 10/18/2016     PSHx:  Past Surgical History:   Procedure Laterality Date    OTHER SURGICAL HISTORY  09/15/2015    Prior Surgical Procedure Not Done    TONSILLECTOMY  03/10/2016    Tonsillectomy With Adenoidectomy      Cholecystectomy 12/28/23    FHx:  Mother: Anemia  Children: 3 - Denies significant medical history  Miscarriages: Denies    Social Hx:  Erika Guardado    reports that she has never smoked. She has never used smokeless tobacco.  She  has no history on file for alcohol use.  She  has no history on file for drug use.  Social History     Socioeconomic History    Marital status: Single   Tobacco Use    Smoking status: Never    Smokeless tobacco: Never     Social Drivers of Health     Financial Resource Strain: Low Risk  (8/20/2024)    Received from [facility]    Overall Financial Resource Strain (CARDIA)     Difficulty of Paying Living Expenses: Not hard at all   Food Insecurity: Patient Declined (8/20/2024)    Received from [facility]    Hunger Vital Sign     Worried About Running Out of Food in the Last Year: Patient declined     Ran Out of Food in the Last Year: Patient declined   Transportation Needs: Patient Declined (8/20/2024)    Received from [facility]    PRAPARE - Transportation     Lack of Transportation (Medical): Patient declined     Lack of Transportation (Non-Medical): Patient declined   Physical Activity: Insufficiently Active (8/20/2024)    Received from [facility]    Exercise Vital Sign     Days of Exercise per Week: 3 days     Minutes of Exercise per Session: 20 min   Stress:  No Stress Concern Present (8/20/2024)    Received from [facility]    [employer] - Occupational Stress Questionnaire     Feeling of Stress : Only a little   Social Connections: Moderately Integrated (8/20/2024)    Received from [facility]    Social Connection and Isolation Panel [NHANES]     Frequency of Communication with Friends and Family: More than three times a week     Frequency of Social Gatherings with Friends and Family: More than three times a week     Attends [employer] Services: 1 to 4 times per year     Active Member of Clubs or Organizations: No     Attends Club or Organization Meetings: Never     Marital Status:    Intimate Partner Violence: Not At Risk (8/20/2024)    Received from [facility]    Humiliation, Afraid, Rape, and Kick questionnaire     Fear of Current or Ex-Partner: No     Emotionally Abused: No     Physically Abused: No     Sexually Abused: No   Housing Stability: Unknown (8/20/2024)    Received from [facility]    Housing Stability Vital Sign     Unable to Pay for Housing in the Last Year: Patient declined     Homeless in the Last Year: No      Living Situation: Lives at home w/ family  Occupation:   Marital Status: Single  Alcohol Use: Socially  Smoking: Never smoker  Recreational Drug Use: Denies  Special Diets: Regular Diet    Cancer Screenings:  Upper EGD: No record in EMR  Colonoscopy: No record in EMR   Mammogram: No record in EMR  PAP smear: No record in EMR  Lung cancer screenings: No record in EMR    Medications and allergies reviewed in EMR.    ROS:  Review of Systems - Oncology   10 point review of systems negative except as state in HPI.    Vitals & Statistics:  Objective   BSA: There is no height or weight on file to calculate BSA.  There were no vitals taken for this visit.    Physical Exam:  Physical Exam  Vitals and nursing note reviewed.   Constitutional:       Appearance: Normal appearance. She is normal weight.   HENT:       "Head: Normocephalic and atraumatic.      Right Ear: External ear normal.      Left Ear: External ear normal.      Nose: Nose normal.      Mouth/Throat:      Mouth: Mucous membranes are moist.      Pharynx: Oropharynx is clear.   Eyes:      Extraocular Movements: Extraocular movements intact.      Conjunctiva/sclera: Conjunctivae normal.      Pupils: Pupils are equal, round, and reactive to light.   Cardiovascular:      Rate and Rhythm: Normal rate and regular rhythm.      Pulses: Normal pulses.      Heart sounds: Normal heart sounds.   Pulmonary:      Effort: Pulmonary effort is normal.      Breath sounds: Normal breath sounds.   Abdominal:      General: Abdomen is flat. Bowel sounds are normal.      Palpations: Abdomen is soft.      Comments: No masses or organomegaly palpable during exam   Musculoskeletal:         General: Normal range of motion.      Cervical back: Normal range of motion.   Lymphadenopathy:      Comments: No lymphadenopathy palpable   Skin:     General: Skin is warm and dry.      Capillary Refill: Capillary refill takes less than 2 seconds.   Neurological:      Mental Status: She is alert and oriented to person, place, and time. Mental status is at baseline.   Psychiatric:         Mood and Affect: Mood normal.         Behavior: Behavior normal.         Thought Content: Thought content normal.         Judgment: Judgment normal.           Results:  Lab Results   Component Value Date    WBC 6.0 08/16/2024    NEUTROABS 3.18 08/16/2024    IGABSOL 0.04 08/16/2024    LYMPHSABS 2.18 08/16/2024    MONOSABS 0.48 08/16/2024    EOSABS 0.12 08/16/2024    BASOSABS 0.04 08/16/2024    RBC 4.75 08/16/2024    MCV 86 08/16/2024    MCHC 32.2 08/16/2024    HGB 13.2 08/16/2024    HCT 41.0 08/16/2024    PLT 71 (L) 08/16/2024     No results found for: \"RETICCTPCT\"   Lab Results   Component Value Date    CREATININE 0.72 08/16/2024    BUN 11 08/16/2024    EGFR >90 08/16/2024     08/16/2024    K 4.2 08/16/2024    CL " "102 08/16/2024    CO2 27 08/16/2024      Lab Results   Component Value Date    ALT 18 08/16/2024    AST 22 08/16/2024    ALKPHOS 73 08/16/2024    BILITOT 0.4 08/16/2024      No results found for: \"TSH\"  No results found for: \"TSH\", \"E7DAPOD\", \"E5IATBE\", \"THYROIDPAB\"  No results found for: \"IRON\", \"TIBC\", \"FERRITIN\"   No results found for: \"DKSYDACZ05\"   No results found for: \"FOLATE\"  No results found for: \"OSITO\", \"RF\", \"SEDRATE\"   No results found for: \"CRP\"   No results found for: \"MIRTA\"  No results found for: \"LDH\"  No results found for: \"HAPTOGLOBIN\"  No results found for: \"SPEP\"  No results found for: \"IGG\", \"IGM\", \"IGA\"  No results found for: \"HEPATOT\", \"HEPAIGM\", \"HEPBCIGM\", \"HEPBCAB\", \"HEPBSAG\", \"HEPCAB\"  No results found for: \"HIV1X2\"    Assessment:  Erika Guardado is a 24 y.o. female referred for consultation of thrombocytopenia    I reviewed patient's chart including but not limited to labs, imaging, surgical/procedure notes, pathology, hospital notes, doctor's notes.    11/18/24 results:  Patient did not have labs drawn following today's visit as advised. Will review results when available and address adverse results as indicated    Plan:  Discussed possible etiologies of thrombocytopenia including: vitamin deficiency, enlarged spleen/liver, autoimmune disease, infection, inflammatory disorder, allergies, collection tube clumping, etc. Will start hematological workup today.    Thrombocytopenia  Lab results pending - Will review when available and address adverse results as needed  RTC in 2-4 weeks via in-person visit - F/U sooner if needed/urgent    I had an extensive discussion with the patient regarding the diagnosis and discussed the plan of therapy, including general considerations regarding side effects and outcomes. Pt understood and gave appropriate teach back about the plan of care. All questions were answered to the patient's satisfaction. The patient is instructed to contact us at any time if " questions or problems arise. Thank you for the opportunity to participate in the care of this very pleasant patient.    Total time = 80 minutes. 50% or more of this time was spent in counseling and/or coordination of care including reviewing medical history/radiology/labs, examining patient, formulating outlined plan with team, and discussing plan with patient/family.    Nader Chopra PA-C

## 2024-11-19 LAB — ANA SER QL HEP2 SUBST: NEGATIVE

## 2024-11-21 LAB
DRVVT SCREEN TO CONFIRM RATIO: 0.96 RATIO
DRVVT/DRVVT CFM NRMLZD PPP-RTO: 0.94 RATIO
DRVVT/DRVVT CFM P DOAC NEUT NORM PPP-RTO: 1.02 RATIO
LA 2 SCREEN W REFLEX-IMP: NORMAL
NORMALIZED SCT PPP-RTO: 0.83 RATIO
SILICA CLOTTING TIME CONFIRMATION: 0.88 RATIO
SILICA CLOTTING TIME SCREEN: 0.72 RATIO

## 2024-11-22 ENCOUNTER — APPOINTMENT (OUTPATIENT)
Dept: RADIOLOGY | Facility: CLINIC | Age: 24
End: 2024-11-22
Payer: COMMERCIAL

## 2024-11-26 ENCOUNTER — HOSPITAL ENCOUNTER (OUTPATIENT)
Dept: RADIOLOGY | Facility: CLINIC | Age: 24
Discharge: HOME | End: 2024-11-26
Payer: COMMERCIAL

## 2024-11-26 DIAGNOSIS — D69.6 THROMBOCYTOPENIA (CMS-HCC): ICD-10-CM

## 2024-11-26 PROCEDURE — 76705 ECHO EXAM OF ABDOMEN: CPT

## 2024-11-26 PROCEDURE — 76705 ECHO EXAM OF ABDOMEN: CPT | Performed by: RADIOLOGY

## 2024-12-02 ENCOUNTER — TELEMEDICINE (OUTPATIENT)
Dept: HEMATOLOGY/ONCOLOGY | Facility: CLINIC | Age: 24
End: 2024-12-02
Payer: COMMERCIAL

## 2024-12-02 DIAGNOSIS — R70.0 ELEVATED ERYTHROCYTE SEDIMENTATION RATE: ICD-10-CM

## 2024-12-02 DIAGNOSIS — M25.551 PAIN OF BOTH HIP JOINTS: ICD-10-CM

## 2024-12-02 DIAGNOSIS — M25.552 PAIN OF BOTH HIP JOINTS: ICD-10-CM

## 2024-12-02 DIAGNOSIS — R76.8 ELEVATED RHEUMATOID FACTOR: ICD-10-CM

## 2024-12-02 DIAGNOSIS — D69.3 IMMUNE THROMBOCYTOPENIA (MULTI): Primary | ICD-10-CM

## 2024-12-02 PROCEDURE — 99442 PR PHYS/QHP TELEPHONE EVALUATION 11-20 MIN: CPT | Performed by: PHYSICIAN ASSISTANT

## 2024-12-02 NOTE — PROGRESS NOTES
Patient ID: Erika Guardado is a 24 y.o. female.  Referring Physician: No referring provider defined for this encounter.  Primary Care Provider: Juan Diego Wheeler MD  Visit Type: Follow Up    Location: [facility]  Diagnosis/Reason: ITP    Interval Hx:  12/2/24  Erika Guardado is a 24 y.o. female following up today for ITP     Patient denies weight loss, abnormal bruising and bleeding, hematuria, blood in stool, dark/black stools, epistaxis, oral/gingival bleeding, lymphadenopathy, recurrent infections, recurrent fevers, night sweats, early satiety, abdominal pain, bone pain, chest pain, palpitations, SOB, BENITEZ, fatigue, dizziness, lightheadedness, PICA.    Relevant Hx:  11/18/24 - Initial Visit  Erika Guardado is a 24 y.o. female referred for consultation of thrombocytopenia    Per review of records:  Thrombocytopenia since at least 10/16//22 71K-308K since then    Menstrual Cycle History:  About how long do your periods last: Currently on Depo and reports not having periods    Previous Hematological Background:  Hx of hematological disorders: No - Patient denies prior hematologic history  Hx of blood transfusions: Yes - Denies adverse effect or reaction  Hx of iron supplementation: No - Denies any previous supplementation  Hx of B12 supplementation: No - Denies any prior supplementation  Hx of folate supplementation: No - Denies any prior supplementation    Today she c/o intermittent SOB, fatigue, intermittent dizziness/lightheadedness    Patient denies weight loss, abnormal bruising and bleeding, hematuria, blood in stool, dark/black stools, epistaxis, oral/gingival bleeding, lymphadenopathy, recurrent infections, recurrent fevers, night sweats, early satiety, abdominal pain, bone pain, chest pain, palpitations, PICA.    PMHx:  Active Ambulatory Problems     Diagnosis Date Noted    Asthma 09/21/2018    Closed fracture of phalanx of toe 08/20/2024    Injury of finger of right hand 08/31/2017     LPRD (laryngopharyngeal reflux disease) 08/20/2024    Myopia of both eyes 02/22/2016    Patellar tendinitis of right knee 08/20/2024    Acne 08/20/2024    Right knee pain 08/20/2024    Snoring 08/20/2024    Carpal tunnel syndrome 08/20/2024    Vitamin D deficiency 01/08/2018    Thrombocytopenia (CMS-HCC) 08/20/2024    Allergic rhinitis 09/05/2024    Anemia affecting pregnancy in third trimester ([facility]) 09/24/2021     Resolved Ambulatory Problems     Diagnosis Date Noted    No Resolved Ambulatory Problems     Past Medical History:   Diagnosis Date    Personal history of other diseases of the respiratory system     Personal history of other diseases of the respiratory system     Personal history of other diseases of the respiratory system     Personal history of other diseases of the respiratory system 10/18/2016     PSHx:  Past Surgical History:   Procedure Laterality Date    OTHER SURGICAL HISTORY  09/15/2015    Prior Surgical Procedure Not Done    TONSILLECTOMY  03/10/2016    Tonsillectomy With Adenoidectomy      Cholecystectomy 12/28/23    FHx:  Mother: Anemia  Children: 3 - Denies significant medical history  Miscarriages: Denies    Social Hx:  Erika MENDOZA Geo    reports that she has never smoked. She has never used smokeless tobacco.  She  has no history on file for alcohol use.  She  has no history on file for drug use.  Social History     Socioeconomic History    Marital status: Single   Tobacco Use    Smoking status: Never    Smokeless tobacco: Never     Social Drivers of Health     Financial Resource Strain: Low Risk  (8/20/2024)    Received from [employer]    Overall Financial Resource Strain (CARDIA)     Difficulty of Paying Living Expenses: Not hard at all   Food Insecurity: Patient Declined (8/20/2024)    Received from [employer]    Hunger Vital Sign     Worried About Running Out of Food in the Last Year: Patient declined     Ran Out of Food in the Last Year: Patient declined   Transportation Needs:  Patient Declined (8/20/2024)    Received from [employer]    PRAPARE - Transportation     Lack of Transportation (Medical): Patient declined     Lack of Transportation (Non-Medical): Patient declined   Physical Activity: Insufficiently Active (8/20/2024)    Received from [employer]    Exercise Vital Sign     Days of Exercise per Week: 3 days     Minutes of Exercise per Session: 20 min   Stress: No Stress Concern Present (8/20/2024)    Received from [employer]    [place] [employer] - Occupational Stress Questionnaire     Feeling of Stress : Only a little   Social Connections: Moderately Integrated (8/20/2024)    Received from [employer]    Social Connection and Isolation Panel [NHANES]     Frequency of Communication with Friends and Family: More than three times a week     Frequency of Social Gatherings with Friends and Family: More than three times a week     Attends [employer] Services: 1 to 4 times per year     Active Member of Clubs or Organizations: No     Attends Club or Organization Meetings: Never     Marital Status:    Intimate Partner Violence: Not At Risk (8/20/2024)    Received from [employer]    Humiliation, Afraid, Rape, and Kick questionnaire     Fear of Current or Ex-Partner: No     Emotionally Abused: No     Physically Abused: No     Sexually Abused: No   Housing Stability: Unknown (8/20/2024)    Received from [employer]    Housing Stability Vital Sign     Unable to Pay for Housing in the Last Year: Patient declined     Homeless in the Last Year: No      Living Situation: Lives at home w/ family  Occupation:   Marital Status: Single  Alcohol Use: Socially  Smoking: Never smoker  Recreational Drug Use: Denies  Special Diets: Regular Diet    Cancer Screenings:  Upper EGD: No record in EMR  Colonoscopy: No record in EMR   Mammogram: No record in EMR  PAP smear: No record in EMR  Lung cancer screenings: No record in EMR    Medications and allergies reviewed in  "EMR.    ROS:  Review of Systems - Oncology   10 point review of systems negative except as state in HPI.    Vitals & Statistics:  Objective   BSA: There is no height or weight on file to calculate BSA.  There were no vitals taken for this visit.    Physical Exam:  Physical Exam  N/A - Virtual visit    Results:  Lab Results   Component Value Date    WBC 4.9 11/18/2024    NEUTROABS 2.47 11/18/2024    IGABSOL 0.02 11/18/2024    LYMPHSABS 1.97 11/18/2024    MONOSABS 0.23 11/18/2024    EOSABS 0.16 11/18/2024    BASOSABS 0.03 11/18/2024    RBC 5.22 (H) 11/18/2024    MCV 88 11/18/2024    MCHC 32.1 11/18/2024    HGB 14.7 11/18/2024    HCT 45.8 11/18/2024    PLT 72 (L) 11/18/2024     Lab Results   Component Value Date    RETICCTPCT 1.6 11/18/2024      Lab Results   Component Value Date    CREATININE 0.81 11/18/2024    BUN 11 11/18/2024    EGFR >90 11/18/2024     11/18/2024    K 4.1 11/18/2024     11/18/2024    CO2 25 11/18/2024      Lab Results   Component Value Date    ALT 21 11/18/2024    AST 15 11/18/2024    ALKPHOS 74 11/18/2024    BILITOT 0.7 11/18/2024      Lab Results   Component Value Date    TSH 0.61 11/18/2024     Lab Results   Component Value Date    TSH 0.61 11/18/2024     Lab Results   Component Value Date    IRON 106 11/18/2024    TIBC 442 11/18/2024    FERRITIN 66 11/18/2024      Lab Results   Component Value Date    [id number] 682 11/18/2024      Lab Results   Component Value Date    FOLATE 12.6 11/18/2024     Lab Results   Component Value Date    OSITO Negative 11/18/2024    RF 47 (H) 11/18/2024    SEDRATE 39 (H) 11/18/2024      Lab Results   Component Value Date    CRP 0.26 11/18/2024      No results found for: \"MIRTA\"  Lab Results   Component Value Date     11/18/2024     Lab Results   Component Value Date    HAPTOGLOBIN 191 11/18/2024     No results found for: \"SPEP\"  No results found for: \"IGG\", \"IGM\", \"IGA\"  Lab Results   Component Value Date    HEPBCIGM Nonreactive 11/18/2024    HEPBSAG " Nonreactive 11/18/2024    HEPCAB Nonreactive 11/18/2024     Lab Results   Component Value Date    HIV1X2 Nonreactive 11/18/2024       Assessment:  Erika Guardado is a 24 y.o. female following up today for ITP    I reviewed patient's chart including but not limited to labs, imaging, surgical/procedure notes, pathology, hospital notes, doctor's notes.    11/18/24 results:  WBC count & differential WNL  NC/NC erythrocytosis at 5.22 - H&H WNL - RDW WNL  Isolated thrombocytopenia at 72K  Iron studies: Ferritin WNL at 66 - Iron, TIBC WNL - %Sat WNL for this practice at 24%  OSITO: Negative  APS ab's: Negative  DRVVT/SCT: Negative  ESR: Elevated at 39 - Indicative of underlying inflammation  RF: Elevated at 47 - Indicative of underlying inflammation  EBV: Indicative of remote/past infection  U/S spleen: Negative for evidence of splenomegaly    Plan:  12/2/24 - Hematologic workup revealed mild erythrocytosis at 5.22 and isolated thrombocytopenia at 72K as well as evidence of underlying inflammation and possible RF. Nutrients WNL. OSITO, DRVVT/SCT and APS' ab's negative. Splenic U/S negative for evidence of splenomegaly. Based on the above findings, most notably the isolated thrombocytopenia, patient's thrombocytopenia is favored to be ITP. During today's call patient reports having joint pain in BLE's frequently which is consistent w/ patient's elevated RF level and is concerning for potential RA. Advised patient that because platelets are above 50K without any active bruising or bleeding, that treatment is not indicated at this time. Also advised patient of the need for rheumatology referral to r/o RA.    ITP  Referral to rheumatology to r/o RA 2/2 elevated ESR, RF and c/o joint pain in BLE  No treatment indicated for thrombocytopenia at this time as platelets > 50K with no active bruising/bleeding  RTC in 4 months w/ CBC-D up to 1 week prior    I had an extensive discussion with the patient regarding the diagnosis and  discussed the plan of therapy, including general considerations regarding side effects and outcomes. Pt understood and gave appropriate teach back about the plan of care. All questions were answered to the patient's satisfaction. The patient is instructed to contact us at any time if questions or problems arise. Thank you for the opportunity to participate in the care of this very pleasant patient.    Total time = 80 minutes. 50% or more of this time was spent in counseling and/or coordination of care including reviewing medical history/radiology/labs, examining patient, formulating outlined plan with team, and discussing plan with patient/family.    Nader Chopra PA-C

## 2025-01-06 ENCOUNTER — APPOINTMENT (OUTPATIENT)
Dept: HEMATOLOGY/ONCOLOGY | Facility: CLINIC | Age: 25
End: 2025-01-06
Payer: COMMERCIAL

## 2025-01-16 ENCOUNTER — APPOINTMENT (OUTPATIENT)
Dept: RHEUMATOLOGY | Facility: CLINIC | Age: 25
End: 2025-01-16
Payer: COMMERCIAL

## 2025-03-11 ENCOUNTER — APPOINTMENT (OUTPATIENT)
Dept: RHEUMATOLOGY | Facility: CLINIC | Age: 25
End: 2025-03-11
Payer: COMMERCIAL

## 2025-04-07 ENCOUNTER — TELEMEDICINE (OUTPATIENT)
Dept: HEMATOLOGY/ONCOLOGY | Facility: CLINIC | Age: 25
End: 2025-04-07
Payer: COMMERCIAL

## 2025-04-07 ENCOUNTER — APPOINTMENT (OUTPATIENT)
Dept: HEMATOLOGY/ONCOLOGY | Facility: CLINIC | Age: 25
End: 2025-04-07
Payer: COMMERCIAL

## 2025-04-07 DIAGNOSIS — D69.3 IMMUNE THROMBOCYTOPENIA (MULTI): ICD-10-CM

## 2025-04-23 ENCOUNTER — APPOINTMENT (OUTPATIENT)
Dept: RHEUMATOLOGY | Facility: CLINIC | Age: 25
End: 2025-04-23
Payer: COMMERCIAL

## 2025-04-23 ENCOUNTER — LAB (OUTPATIENT)
Dept: LAB | Facility: HOSPITAL | Age: 25
End: 2025-04-23
Payer: COMMERCIAL

## 2025-04-23 VITALS
SYSTOLIC BLOOD PRESSURE: 103 MMHG | BODY MASS INDEX: 26.39 KG/M2 | DIASTOLIC BLOOD PRESSURE: 73 MMHG | TEMPERATURE: 97.1 F | WEIGHT: 149 LBS | HEART RATE: 74 BPM | OXYGEN SATURATION: 97 %

## 2025-04-23 DIAGNOSIS — R76.8 ELEVATED RHEUMATOID FACTOR: ICD-10-CM

## 2025-04-23 DIAGNOSIS — Z86.2 HISTORY OF ITP: Primary | ICD-10-CM

## 2025-04-23 DIAGNOSIS — R70.0 ELEVATED ERYTHROCYTE SEDIMENTATION RATE: ICD-10-CM

## 2025-04-23 DIAGNOSIS — D69.3 IMMUNE THROMBOCYTOPENIC PURPURA (MULTI): Primary | ICD-10-CM

## 2025-04-23 DIAGNOSIS — M25.552 PAIN OF BOTH HIP JOINTS: ICD-10-CM

## 2025-04-23 DIAGNOSIS — M25.551 PAIN OF BOTH HIP JOINTS: ICD-10-CM

## 2025-04-23 DIAGNOSIS — D69.3 IMMUNE THROMBOCYTOPENIA (MULTI): ICD-10-CM

## 2025-04-23 LAB
BASOPHILS # BLD AUTO: 0.06 X10*3/UL (ref 0–0.1)
BASOPHILS NFR BLD AUTO: 0.8 %
EOSINOPHIL # BLD AUTO: 0.32 X10*3/UL (ref 0–0.7)
EOSINOPHIL NFR BLD AUTO: 4.2 %
ERYTHROCYTE [DISTWIDTH] IN BLOOD BY AUTOMATED COUNT: 13.8 % (ref 11.5–14.5)
HCT VFR BLD AUTO: 40.3 % (ref 36–46)
HGB BLD-MCNC: 12.4 G/DL (ref 12–16)
IMM GRANULOCYTES # BLD AUTO: 0.05 X10*3/UL (ref 0–0.7)
IMM GRANULOCYTES NFR BLD AUTO: 0.7 % (ref 0–0.9)
LYMPHOCYTES # BLD AUTO: 2.6 X10*3/UL (ref 1.2–4.8)
LYMPHOCYTES NFR BLD AUTO: 34.5 %
MCH RBC QN AUTO: 27.6 PG (ref 26–34)
MCHC RBC AUTO-ENTMCNC: 30.8 G/DL (ref 32–36)
MCV RBC AUTO: 90 FL (ref 80–100)
MONOCYTES # BLD AUTO: 0.36 X10*3/UL (ref 0.1–1)
MONOCYTES NFR BLD AUTO: 4.8 %
NEUTROPHILS # BLD AUTO: 4.14 X10*3/UL (ref 1.2–7.7)
NEUTROPHILS NFR BLD AUTO: 55 %
NRBC BLD-RTO: 0 /100 WBCS (ref 0–0)
PLATELET # BLD AUTO: 138 X10*3/UL (ref 150–450)
RBC # BLD AUTO: 4.49 X10*6/UL (ref 4–5.2)
RBC MORPH BLD: NORMAL
WBC # BLD AUTO: 7.5 X10*3/UL (ref 4.4–11.3)

## 2025-04-23 PROCEDURE — 99205 OFFICE O/P NEW HI 60 MIN: CPT | Performed by: STUDENT IN AN ORGANIZED HEALTH CARE EDUCATION/TRAINING PROGRAM

## 2025-04-23 PROCEDURE — 1036F TOBACCO NON-USER: CPT | Performed by: STUDENT IN AN ORGANIZED HEALTH CARE EDUCATION/TRAINING PROGRAM

## 2025-04-23 PROCEDURE — 85025 COMPLETE CBC W/AUTO DIFF WBC: CPT

## 2025-04-23 RX ORDER — ASPIRIN 81 MG
TABLET, DELAYED RELEASE (ENTERIC COATED) ORAL
COMMUNITY
Start: 2025-04-16

## 2025-04-23 RX ORDER — AZITHROMYCIN 250 MG/1
TABLET, FILM COATED ORAL
COMMUNITY
Start: 2025-02-25

## 2025-04-23 RX ORDER — BROMPHENIRAMINE MALEATE, PSEUDOEPHEDRINE HYDROCHLORIDE, AND DEXTROMETHORPHAN HYDROBROMIDE 2; 30; 10 MG/5ML; MG/5ML; MG/5ML
SYRUP ORAL
COMMUNITY

## 2025-04-23 RX ORDER — METHYLPREDNISOLONE 4 MG/1
TABLET ORAL
COMMUNITY
Start: 2025-02-25

## 2025-04-23 RX ORDER — ALBUTEROL SULFATE 90 UG/1
INHALANT RESPIRATORY (INHALATION)
COMMUNITY
Start: 2025-02-25

## 2025-04-23 RX ORDER — IBUPROFEN 600 MG/1
TABLET ORAL
COMMUNITY
Start: 2025-04-16

## 2025-04-23 ASSESSMENT — PAIN SCALES - GENERAL: PAINLEVEL_OUTOF10: 0-NO PAIN

## 2025-04-23 NOTE — PROGRESS NOTES
I saw and evaluated the patient. I personally obtained the key and critical portions of the history and physical exam or was physically present for key and critical portions performed by the trainee. I reviewed the trainee's documentation and discussed the patient with the trainee. I agree with the trainee's medical decision making, as documented on the trainee's note.     Bernardo Underwood MD

## 2025-04-23 NOTE — PROGRESS NOTES
Rheumatology Note      Patient Erika Guardado  MRN [id number]     Subjective    Subjective   History of Presenting Illness  Ms. Erika Guardado is a 25 y.o. female with a past medical history as mentioned below, referred by Hematology for evaluation of elevated RF and ESR in the setting of joint pain and ITP.    She reports intermittent numbness and tingling in both arms and hands, occasionally associated with transient weakness. These episodes often occur during sleep, sometimes waking her when her arm feels numb. Symptoms typically resolve within 4-5 minutes. No specific triggers, distribution, or time-of-day patterns identified.    She also describes recurrent bilateral hand swelling, sometimes affecting the whole hand or an entire finger. Occurs ~twice per month, resolves spontaneously or with Tylenol. No current lower extremity joint pain. She recalls episodes of right knee pain and swelling between 8464-4072, but not recently.  In general, her symptoms have not significantly limited function or mobility.    She has a history of migraines, previously followed by Neurology, but stopped as symptoms improved. Reports increased headache frequency over the past month but denies visual changes, confusion, or focal neurologic deficits.    She denies fevers, weight loss, rashes, oral ulcers, photosensitivity, Raynaud's, dry eyes or dry mouth. No history of psoriasis, uveitis, inflammatory bowel disease, or recent GI/genitourinary infections.    ITP was diagnosed after isolated thrombocytopenia (platelets 72K) was noted on routine labs. She has not required treatment and is monitored by Hematology. No history of bleeding.   Workup to date includes:  Negative OSITO, APS antibodies, DRVVT/SCT  Elevated RF (47) and ESR (39)  Normal complements, CBC otherwise WNL, iron studies WNL  Splenic ultrasound: no splenomegaly    Fhx: no known hx autoimmune disease     ROS:  Constitutional: Denies fever, chills, fatigue  Head: Denies  headaches or hair loss  Eyes: Denies dry eyes, blurry vision, redness of the eyes, pain in the eyes or H/O uveitis  ENT: Denies dry mouth, loss of taste, sores in the mouth, nose bleed, or difficulty swallowing  Cardiovascular: Denies chest pain, palpitations  Respiratory: Denies shortness of breath or cough or wheezing  Gastrointestinal: Denies nausea, vomiting, heartburn, abdominal pain , diarrhea or blood in the stool  Genitourinary: No urinary urgency, frequency, dysuria   Integumentary: Denies photosensitivity, rash or lesions, Raynaud's or psoriasis  Neurological: Denies any numbness or tingling or weakness  Hematologic/Lymphatic: Denies bleeding, bruising, Raynaud's phenomenon  MSK: As per HPI. No enthesitis, sausage finger, finger tip ulceration, or back pain    Medical History[1]     RX Allergies[2]     Objective   Objective     There were no vitals taken for this visit.     PHYSICAL EXAM:  General - NAD, pleasant, AAOx3  Head: Normocephalic, atraumatic  Eyes - PERRLA, EOMI. No conjunctiva injection.   Mouth/ENT - Moist oral and nasal mucosa. No facial rash. No enlarged parotid or submandibular gland. Adequate salivary pooling.  Cardiovascular - Regular rate and rhythm. No murmurs or rubs.  Lungs - Clear to auscultation bilaterally.   Skin - No rashes or ulcers. No erythema on bilateral cheeks.  Abdomen - Soft, non-tender. No masses.   Extremities - No edema, cyanosis ,or clubbing  Neurological - Alert and oriented x 3,  grossly intact. No focal deficit.    Musculoskeletal  Shoulders: Full ROM, without pain, no swelling, warmth or tenderness.  Elbows: Full ROM, without pain, no swelling, warmth or tenderness.  Wrists: Full ROM, without pain, no swelling, warmth or tenderness.  MCP: No swelling, warmth or tenderness. Metacarpal squeeze negative  PIP: No swelling, warmth or tenderness.  DIP: No swelling, warmth or tenderness.  Hands : 5/5.    Sacroiliac joints: No local tenderness. ARELI negative.   Hips:  "Full ROM.  No malalignment.  Knees:  Full ROM, without pain, no swelling, warmth or point tenderness. No joint line tenderness, no pes anserine tenderness.  Ankles: Full ROM, without pain, swelling, warmth or tenderness.  Toes: No swelling, warmth or tenderness. Metatarsal squeeze negative  Cervical spine: No tenderness or limitation of movement  Lumbar spine: No tenderness or limitation of movement     LABS:  Lab Results   Component Value Date    WBC 4.9 11/18/2024    HGB 14.7 11/18/2024    HCT 45.8 11/18/2024    MCV 88 11/18/2024    PLT 72 (L) 11/18/2024      Lab Results   Component Value Date    GLUCOSE 89 11/18/2024    CO2 25 11/18/2024    BUN 11 11/18/2024    EGFR >90 11/18/2024    CALCIUM 10.0 11/18/2024    ALBUMIN 5.1 (H) 11/18/2024      Lab Results   Component Value Date    CRP 0.26 11/18/2024     Lab Results   Component Value Date    SEDRATE 39 (H) 11/18/2024     No results found for: \"C3\", \"C4\"  Lab Results   Component Value Date    RF 47 (H) 11/18/2024     No results found for: \"ANATITER\", \"ARNP\", \"ASMRN\", \"ASSA\", \"ASSB\", \"ASCL\", \"JO1\", \"ACHR\", \"ACEN\", \"RIBPP\", \"DNADS\", \"ANCPA\", \"ANCTI\", \"PR3\", \"MPO\"  Lab Results   Component Value Date    PROTUR 20 (TRACE) 08/16/2024    GLUCOSEU Normal 08/16/2024    BLOODU NEGATIVE 08/16/2024    KETONESU NEGATIVE 08/16/2024    NITRITEU NEGATIVE 08/16/2024    LEUKOCYTESU 500 Alejandro/µL (A) 08/16/2024     No results found for: \"UTPCR\"     No results found for: \"URICACID\"  No results found for: \"COLORFL\", \"CLARITYFLUID\", \"WBCFL\", \"NEUTROBFREL\", \"LYMPHSBFREL\", \"EOSBFREL\", \"BASOBFREL\", \"PLASMACFLD\", \"RBCFL\", \"CRYSFL\"    IMAGING:  === 08/16/24 ===    CT HEAD WO IV CONTRAST    - Impression -  1. Normal CT head      MACRO:  None    Signed by: Simon Benavides 8/16/2024 3:54 PM  Dictation workstation:   [id number]      === 10/16/22 ===    CT HEAD WO IV CONTRAST    - Impression -  1. No acute intracranial hemorrhage or mass effect.  2. Paranasal sinus mucosal thickening/fluid. Please " correlate for  rhinosinusitis.      === 04/23/22 ===    CT LUMBAR SPINE WO IV CONTRAST    - Impression -  Minimal degenerative change without osseous injury evident. Given the  ordering indication, MRI may be helpful.     Assessment    Assessment & Plan   Ms. Erika Guardado is a 25 y.o. female who presents with elevated RF and ESR in the setting of joint pain and ITP.     Elevated RF and ESR with intermittent joint symptoms possibly suggestive of early inflammatory arthritis, however, no significant AM stiffness and no clinical synovitis on current exam. Intermittent paresthesias with positional symptoms possibly a compressive neuropathy and increased headaches concerning for migraine recurrence otherwise, no current red flags. Platelets stable >50K and patient asymptomatic. Hematology continues to monitor; no treatment required currently. Overall, low suspicion for RA and no current indication for treatment.     Plan:  -recheck CBC, CMP, and inflammatory markers   -check anti-ccp, C3, C4, and cryoglobulins  -No DMARD initiation at this time; monitor for evolving synovitis or persistent inflammation  -advised to monitor paresthesias and discuss referral to Neurology with PCP if persistent/worsening   -will call to discuss results, if no new/concerning findings can follow up PRN     Case seen and discussed with Dr. Underwood  Signature: Jaci Whitfield DO  Date: April 23, 2025          [1]   Past Medical History:  Diagnosis Date    Personal history of other diseases of the respiratory system     History of asthma    Personal history of other diseases of the respiratory system     History of chronic pharyngitis    Personal history of other diseases of the respiratory system     History of sinusitis    Personal history of other diseases of the respiratory system 10/18/2016    History of acute pharyngitis    Snoring     Snoring   [2]   Allergies  Allergen Reactions    Amoxicillin Rash and Unknown     rash   rash    rash     Grass Pollen Unknown    House Dust Unknown

## 2025-04-24 LAB
ALBUMIN SERPL-MCNC: 4.5 G/DL (ref 3.6–5.1)
ALP SERPL-CCNC: 68 U/L (ref 31–125)
ALT SERPL-CCNC: 21 U/L (ref 6–29)
ANION GAP SERPL CALCULATED.4IONS-SCNC: 7 MMOL/L (CALC) (ref 7–17)
AST SERPL-CCNC: 19 U/L (ref 10–30)
BASOPHILS # BLD AUTO: 37 CELLS/UL (ref 0–200)
BASOPHILS NFR BLD AUTO: 0.5 %
BILIRUB SERPL-MCNC: 0.3 MG/DL (ref 0.2–1.2)
BUN SERPL-MCNC: 7 MG/DL (ref 7–25)
C3 SERPL-MCNC: 190 MG/DL (ref 83–193)
C4 SERPL-MCNC: 43 MG/DL (ref 15–57)
CALCIUM SERPL-MCNC: 9.9 MG/DL (ref 8.6–10.2)
CCP IGG SERPL-ACNC: <16 UNITS
CHLORIDE SERPL-SCNC: 104 MMOL/L (ref 98–110)
CO2 SERPL-SCNC: 28 MMOL/L (ref 20–32)
CREAT SERPL-MCNC: 0.67 MG/DL (ref 0.5–0.96)
CRP SERPL-MCNC: 5.4 MG/L
CRYOGLOB SER QL: NORMAL
EGFRCR SERPLBLD CKD-EPI 2021: 124 ML/MIN/1.73M2
EOSINOPHIL # BLD AUTO: 321 CELLS/UL (ref 15–500)
EOSINOPHIL NFR BLD AUTO: 4.4 %
ERYTHROCYTE [DISTWIDTH] IN BLOOD BY AUTOMATED COUNT: 12.9 % (ref 11–15)
ERYTHROCYTE [SEDIMENTATION RATE] IN BLOOD BY WESTERGREN METHOD: 28 MM/H
GLUCOSE SERPL-MCNC: 91 MG/DL (ref 65–139)
HCT VFR BLD AUTO: 39.3 % (ref 35–45)
HGB BLD-MCNC: 12.5 G/DL (ref 11.7–15.5)
LYMPHOCYTES # BLD AUTO: 2475 CELLS/UL (ref 850–3900)
LYMPHOCYTES NFR BLD AUTO: 33.9 %
MCH RBC QN AUTO: 27.7 PG (ref 27–33)
MCHC RBC AUTO-ENTMCNC: 31.8 G/DL (ref 32–36)
MCV RBC AUTO: 87.1 FL (ref 80–100)
MONOCYTES # BLD AUTO: 409 CELLS/UL (ref 200–950)
MONOCYTES NFR BLD AUTO: 5.6 %
NEUTROPHILS # BLD AUTO: 4059 CELLS/UL (ref 1500–7800)
NEUTROPHILS NFR BLD AUTO: 55.6 %
PLATELET # BLD AUTO: 145 THOUSAND/UL (ref 140–400)
PMV BLD REES-ECKER: 14.3 FL (ref 7.5–12.5)
POTASSIUM SERPL-SCNC: 4.8 MMOL/L (ref 3.5–5.3)
PROT SERPL-MCNC: 8 G/DL (ref 6.1–8.1)
RBC # BLD AUTO: 4.51 MILLION/UL (ref 3.8–5.1)
SODIUM SERPL-SCNC: 139 MMOL/L (ref 135–146)
WBC # BLD AUTO: 7.3 THOUSAND/UL (ref 3.8–10.8)

## 2025-05-05 ENCOUNTER — TELEMEDICINE (OUTPATIENT)
Dept: HEMATOLOGY/ONCOLOGY | Facility: CLINIC | Age: 25
End: 2025-05-05
Payer: COMMERCIAL

## 2025-05-05 DIAGNOSIS — D69.3 IMMUNE THROMBOCYTOPENIA (MULTI): Primary | ICD-10-CM

## 2025-05-05 PROCEDURE — 99212 OFFICE O/P EST SF 10 MIN: CPT | Performed by: PHYSICIAN ASSISTANT

## 2025-05-05 NOTE — PROGRESS NOTES
Patient ID: Erika Guardado is a 25 y.o. female.  Referring Physician: No referring provider defined for this encounter.  Primary Care Provider: Juan Diego Wheeler MD  Visit Type: Follow Up    Location: [facility]  Diagnosis/Reason: ITP    Virtual or Telephone Consent  An interactive audio and video telecommunication system which permits real time communications between the patient (at the originating site) and provider (at the distant site) was utilized to provide this telehealth service.   Verbal consent was requested and obtained from Erika Guardado on this date, 05/05/25 for a telehealth visit and the patient's location was confirmed at the time of the visit.     Interval Hx:  5/5/25   Erika Guardado is a 25 y.o. female following up today for ITP     Patient denies weight loss, abnormal bruising and bleeding, hematuria, blood in stool, dark/black stools, epistaxis, oral/gingival bleeding, lymphadenopathy, recurrent infections, recurrent fevers, night sweats, early satiety, abdominal pain, bone pain, chest pain, palpitations, SOB, BENITEZ, fatigue, dizziness, lightheadedness, PICA.    Relevant Hx:  11/18/24 - Initial Visit  Erika Guardado is a 25 y.o. female referred for consultation of thrombocytopenia    Per review of records:  Thrombocytopenia since at least 10/16//22 71K-308K since then    Menstrual Cycle History:  About how long do your periods last: Currently on Depo and reports not having periods    Previous Hematological Background:  Hx of hematological disorders: No - Patient denies prior hematologic history  Hx of blood transfusions: Yes - Denies adverse effect or reaction  Hx of iron supplementation: No - Denies any previous supplementation  Hx of B12 supplementation: No - Denies any prior supplementation  Hx of folate supplementation: No - Denies any prior supplementation    Today she c/o intermittent SOB, fatigue, intermittent dizziness/lightheadedness    Patient denies weight loss,  abnormal bruising and bleeding, hematuria, blood in stool, dark/black stools, epistaxis, oral/gingival bleeding, lymphadenopathy, recurrent infections, recurrent fevers, night sweats, early satiety, abdominal pain, bone pain, chest pain, palpitations, PICA.    PMHx:  Active Ambulatory Problems     Diagnosis Date Noted    Asthma 09/21/2018    Closed fracture of phalanx of toe 08/20/2024    Injury of finger of right hand 08/31/2017    LPRD (laryngopharyngeal reflux disease) 08/20/2024    Myopia of both eyes 02/22/2016    Patellar tendinitis of right knee 08/20/2024    Acne 08/20/2024    Right knee pain 08/20/2024    Snoring 08/20/2024    Carpal tunnel syndrome 08/20/2024    Vitamin D deficiency 01/08/2018    Thrombocytopenia (CMS-HCC) 08/20/2024    Allergic rhinitis 09/05/2024    Anemia affecting pregnancy in third trimester 09/24/2021     Resolved Ambulatory Problems     Diagnosis Date Noted    No Resolved Ambulatory Problems     Past Medical History:   Diagnosis Date    Personal history of other diseases of the respiratory system     Personal history of other diseases of the respiratory system     Personal history of other diseases of the respiratory system     Personal history of other diseases of the respiratory system 10/18/2016     PSHx:  Past Surgical History:   Procedure Laterality Date    OTHER SURGICAL HISTORY  09/15/2015    Prior Surgical Procedure Not Done    TONSILLECTOMY  03/10/2016    Tonsillectomy With Adenoidectomy      Cholecystectomy 12/28/23    FHx:  Mother: Anemia  Children: 3 - Denies significant medical history  Miscarriages: Denies    Social Hx:  Erika Guardado    reports that she has never smoked. She has never used smokeless tobacco.  She  has no history on file for alcohol use.  She  has no history on file for drug use.  Social History     Socioeconomic History    Marital status: Single   Tobacco Use    Smoking status: Never    Smokeless tobacco: Never     Social Drivers of Health      Financial Resource Strain: Low Risk  (8/20/2024)    Received from [facility]    Overall Financial Resource Strain (CARDIA)     Difficulty of Paying Living Expenses: Not hard at all   Food Insecurity: Patient Declined (8/20/2024)    Received from [facility]    Hunger Vital Sign     Worried About Running Out of Food in the Last Year: Patient declined     Ran Out of Food in the Last Year: Patient declined   Transportation Needs: Patient Declined (8/20/2024)    Received from [facility]    PRAPARE - Transportation     Lack of Transportation (Medical): Patient declined     Lack of Transportation (Non-Medical): Patient declined   Physical Activity: Insufficiently Active (8/20/2024)    Received from [facility]    Exercise Vital Sign     Days of Exercise per Week: 3 days     Minutes of Exercise per Session: 20 min   Stress: No Stress Concern Present (8/20/2024)    Received from [facility]    [facility] of Occupational Health - Occupational Stress Questionnaire     Feeling of Stress : Only a little   Social Connections: Moderately Integrated (8/20/2024)    Received from [facility]    Social Connection and Isolation Panel [NHANES]     Frequency of Communication with Friends and Family: More than three times a week     Frequency of Social Gatherings with Friends and Family: More than three times a week     Attends Mu-ism Services: 1 to 4 times per year     Active Member of Clubs or Organizations: No     Attends Club or Organization Meetings: Never     Marital Status:    Intimate Partner Violence: Not At Risk (8/20/2024)    Received from [facility]    Humiliation, Afraid, Rape, and Kick questionnaire     Fear of Current or Ex-Partner: No     Emotionally Abused: No     Physically Abused: No     Sexually Abused: No   Housing Stability: Unknown (8/20/2024)    Received from [facility]    Housing Stability Vital Sign     Unable to Pay for Housing in the Last Year: Patient declined     Homeless in the Last  Year: No      Living Situation: Lives at home w/ family  Occupation:   Marital Status: Single  Alcohol Use: Socially  Smoking: Never smoker  Recreational Drug Use: Denies  Special Diets: Regular Diet    Cancer Screenings:  Upper EGD: No record in EMR  Colonoscopy: No record in EMR   Mammogram: No record in EMR  PAP smear: No record in EMR  Lung cancer screenings: No record in EMR    Medications and allergies reviewed in EMR.    ROS:  Review of Systems - Oncology   10 point review of systems negative except as state in HPI.    Vitals & Statistics:  Objective   BSA: There is no height or weight on file to calculate BSA.  There were no vitals taken for this visit.    Physical Exam:  Physical Exam  N/A - Virtual visit    Results:  Lab Results   Component Value Date    WBC 7.5 04/23/2025    NEUTROABS 4.14 04/23/2025    IGABSOL 0.05 04/23/2025    LYMPHSABS 2.60 04/23/2025    MONOSABS 0.36 04/23/2025    EOSABS 0.32 04/23/2025    BASOSABS 0.06 04/23/2025    RBC 4.49 04/23/2025    MCV 90 04/23/2025    MCHC 30.8 (L) 04/23/2025    HGB 12.4 04/23/2025    HCT 40.3 04/23/2025     (L) 04/23/2025     Lab Results   Component Value Date    RETICCTPCT 1.6 11/18/2024      Lab Results   Component Value Date    CREATININE 0.67 04/23/2025    BUN 7 04/23/2025    EGFR 124 04/23/2025     04/23/2025    K 4.8 04/23/2025     04/23/2025    CO2 28 04/23/2025      Lab Results   Component Value Date    ALT 21 04/23/2025    AST 19 04/23/2025    ALKPHOS 68 04/23/2025    BILITOT 0.3 04/23/2025      Lab Results   Component Value Date    TSH 0.61 11/18/2024     Lab Results   Component Value Date    TSH 0.61 11/18/2024     Lab Results   Component Value Date    IRON 106 11/18/2024    TIBC 442 11/18/2024    FERRITIN 66 11/18/2024      Lab Results   Component Value Date    HLQQUWMH99 682 11/18/2024      Lab Results   Component Value Date    FOLATE 12.6 11/18/2024     Lab Results   Component Value Date    OSITO Negative 11/18/2024     "RF 47 (H) 11/18/2024    SEDRATE 28 (H) 04/23/2025      Lab Results   Component Value Date    CRP 5.4 04/23/2025      No results found for: \"MIRTA\"  Lab Results   Component Value Date     11/18/2024     Lab Results   Component Value Date    HAPTOGLOBIN 191 11/18/2024     No results found for: \"SPEP\"  No results found for: \"IGG\", \"IGM\", \"IGA\"  Lab Results   Component Value Date    HEPBCIGM Nonreactive 11/18/2024    HEPBSAG Nonreactive 11/18/2024    HEPCAB Nonreactive 11/18/2024     Lab Results   Component Value Date    HIV1X2 Nonreactive 11/18/2024       Assessment:  Erika Guardado is a 25 y.o. female following up today for ITP    I reviewed patient's chart including but not limited to labs, imaging, surgical/procedure notes, pathology, hospital notes, doctor's notes.    4/23/25 results:  WBC count & differential WNL  RBC Count, MCV, H/H, RDW all WNL, MCHC low  Improved isolated thrombocytopenia at 138k      Plan:  12/2/24 - Hematologic workup revealed mild erythrocytosis at 5.22 and isolated thrombocytopenia at 72K as well as evidence of underlying inflammation and possible RF. Nutrients WNL. OSITO, DRVVT/SCT and APS' ab's negative. Splenic U/S negative for evidence of splenomegaly. Based on the above findings, most notably the isolated thrombocytopenia, patient's thrombocytopenia is favored to be ITP. During today's call patient reports having joint pain in BLE's frequently which is consistent w/ patient's elevated RF level and is concerning for potential RA. Advised patient that because platelets are above 50K without any active bruising or bleeding, that treatment is not indicated at this time. Also advised patient of the need for rheumatology referral to r/o RA.    ITP  No treatment indicated for thrombocytopenia at this time as platelets > 50K with no active bruising/bleeding  RTC in 6 months w/ CBC-D up to 1 week prior    I had an extensive discussion with the patient regarding the diagnosis and discussed " the plan of therapy, including general considerations regarding side effects and outcomes. Pt understood and gave appropriate teach back about the plan of care. All questions were answered to the patient's satisfaction. The patient is instructed to contact us at any time if questions or problems arise. Thank you for the opportunity to participate in the care of this very pleasant patient.    Total time = 30 minutes. 50% or more of this time was spent in counseling and/or coordination of care including reviewing medical history/radiology/labs, examining patient, formulating outlined plan with team, and discussing plan with patient/family.    Nader Chopra PA-C